# Patient Record
Sex: FEMALE | Race: WHITE | HISPANIC OR LATINO | Employment: FULL TIME | URBAN - METROPOLITAN AREA
[De-identification: names, ages, dates, MRNs, and addresses within clinical notes are randomized per-mention and may not be internally consistent; named-entity substitution may affect disease eponyms.]

---

## 2023-11-20 ENCOUNTER — HOSPITAL ENCOUNTER (EMERGENCY)
Facility: HOSPITAL | Age: 23
Discharge: HOME | End: 2023-11-20
Payer: COMMERCIAL

## 2023-11-20 VITALS
RESPIRATION RATE: 16 BRPM | HEART RATE: 70 BPM | TEMPERATURE: 97.5 F | DIASTOLIC BLOOD PRESSURE: 56 MMHG | WEIGHT: 112 LBS | HEIGHT: 62 IN | OXYGEN SATURATION: 98 % | BODY MASS INDEX: 20.61 KG/M2 | SYSTOLIC BLOOD PRESSURE: 127 MMHG

## 2023-11-20 DIAGNOSIS — N30.91 HEMORRHAGIC CYSTITIS: Primary | ICD-10-CM

## 2023-11-20 DIAGNOSIS — R30.0 DYSURIA: ICD-10-CM

## 2023-11-20 LAB
APPEARANCE UR: ABNORMAL
BACTERIA #/AREA URNS AUTO: ABNORMAL /HPF
BILIRUB UR STRIP.AUTO-MCNC: NEGATIVE MG/DL
COLOR UR: ABNORMAL
GLUCOSE UR STRIP.AUTO-MCNC: NEGATIVE MG/DL
HCG UR QL IA.RAPID: NEGATIVE
KETONES UR STRIP.AUTO-MCNC: NEGATIVE MG/DL
LEUKOCYTE ESTERASE UR QL STRIP.AUTO: NEGATIVE
NITRITE UR QL STRIP.AUTO: POSITIVE
PH UR STRIP.AUTO: 6 [PH]
PROT UR STRIP.AUTO-MCNC: ABNORMAL MG/DL
RBC # UR STRIP.AUTO: ABNORMAL /UL
RBC #/AREA URNS AUTO: >20 /HPF
SP GR UR STRIP.AUTO: 1.02
UROBILINOGEN UR STRIP.AUTO-MCNC: 4 MG/DL
WBC #/AREA URNS AUTO: >50 /HPF
WBC CLUMPS #/AREA URNS AUTO: ABNORMAL /HPF

## 2023-11-20 PROCEDURE — 99285 EMERGENCY DEPT VISIT HI MDM: CPT

## 2023-11-20 PROCEDURE — 81001 URINALYSIS AUTO W/SCOPE: CPT | Performed by: NURSE PRACTITIONER

## 2023-11-20 PROCEDURE — 99283 EMERGENCY DEPT VISIT LOW MDM: CPT | Performed by: PHYSICIAN ASSISTANT

## 2023-11-20 PROCEDURE — 81025 URINE PREGNANCY TEST: CPT | Performed by: NURSE PRACTITIONER

## 2023-11-20 RX ORDER — SULFAMETHOXAZOLE AND TRIMETHOPRIM 800; 160 MG/1; MG/1
1 TABLET ORAL 2 TIMES DAILY
Qty: 10 TABLET | Refills: 0 | Status: SHIPPED | OUTPATIENT
Start: 2023-11-20 | End: 2023-11-25

## 2023-11-20 ASSESSMENT — LIFESTYLE VARIABLES
EVER HAD A DRINK FIRST THING IN THE MORNING TO STEADY YOUR NERVES TO GET RID OF A HANGOVER: NO
HAVE YOU EVER FELT YOU SHOULD CUT DOWN ON YOUR DRINKING: NO
REASON UNABLE TO ASSESS: NO
EVER FELT BAD OR GUILTY ABOUT YOUR DRINKING: NO
HAVE PEOPLE ANNOYED YOU BY CRITICIZING YOUR DRINKING: NO

## 2023-11-20 ASSESSMENT — COLUMBIA-SUICIDE SEVERITY RATING SCALE - C-SSRS
1. IN THE PAST MONTH, HAVE YOU WISHED YOU WERE DEAD OR WISHED YOU COULD GO TO SLEEP AND NOT WAKE UP?: NO
2. HAVE YOU ACTUALLY HAD ANY THOUGHTS OF KILLING YOURSELF?: NO
6. HAVE YOU EVER DONE ANYTHING, STARTED TO DO ANYTHING, OR PREPARED TO DO ANYTHING TO END YOUR LIFE?: NO

## 2023-11-20 ASSESSMENT — PAIN SCALES - GENERAL: PAINLEVEL_OUTOF10: 0 - NO PAIN

## 2023-11-20 ASSESSMENT — PAIN - FUNCTIONAL ASSESSMENT: PAIN_FUNCTIONAL_ASSESSMENT: 0-10

## 2023-11-20 NOTE — ED TRIAGE NOTES
Patient arrives reporting hematuria and dysuria that began last night. She does report she started a new birth control approximately 2 weeks ago. LMP 11/2. Denies any nausea/fevers/chills

## 2023-11-20 NOTE — ED TRIAGE NOTES
This patient was seen and examined in triage    HPI:  Patient is a healthy nontoxic-appearing 23-year-old female with no past medical history, presents to the emergency today for complaint of blood in the urine and burning with urination that began last night.  Patient denies any vaginal discharge or vaginal bleeding.  Patient states last menstrual cycle was 11 6.  Patient denies any injuries trauma or falls, back pain, abdominal pain nausea vomiting, diarrhea or constipation.  Patient denies any fever, shaking, or chills.    Focused PE:  Gen: Well-appearing, not in acute distress  Cardiovascular: Regular rate, normal rhythm, no murmur, no gallop  Respiratory: No adventitious lung sounds auscultated.  Abdomen: No reproducible abdominal tenderness upon palpation,  physical exam may be limited by patient positioning sitting up in a chair  Neuro:  Alert and Oriented, speech clear and coherent    Plan:  Urinalysis and pregnancy test ordered from triage.    For the remainder the patient's work-up and ED course, please see the main ED provider note.  We discussed need for diagnostic testing including lab studies and imaging.  We also discussed that they may be asked to wait in the waiting room while these test are pending.  They understand that if they choose to leave without having the testing completed or resulted that we cannot rule out acute life-threatening illnesses and the risks involved to lead to worsening condition, permanent disability or even death.

## 2023-11-21 NOTE — ED PROVIDER NOTES
HPI   Chief Complaint   Patient presents with    Blood in Urine     Patient arrives reporting hematuria and dysuria that began last night. She does report she started a new birth control approximately 2 weeks ago. LMP 11/2. Denies any nausea/fevers/chills       23-year-old female presents for UTI-like symptoms.  Patient states symptoms for the past few days which include dysuria and bright red blood associated with the tissue paper upon wiping.  Denies fevers.  No reports of lower back discomfort.  She states no intervention prior to arrival                          No data recorded                Patient History   No past medical history on file.  No past surgical history on file.  No family history on file.  Social History     Tobacco Use    Smoking status: Not on file    Smokeless tobacco: Not on file   Substance Use Topics    Alcohol use: Not on file    Drug use: Not on file       Physical Exam   ED Triage Vitals [11/20/23 1834]   Temp Heart Rate Resp BP   36.4 °C (97.5 °F) 70 16 127/56      SpO2 Temp src Heart Rate Source Patient Position   98 % -- -- --      BP Location FiO2 (%)     -- --       Physical Exam  Vitals and nursing note reviewed.   Constitutional:       General: She is not in acute distress.     Appearance: Normal appearance. She is normal weight. She is not ill-appearing, toxic-appearing or diaphoretic.   HENT:      Head: Normocephalic.      Nose: Nose normal.      Mouth/Throat:      Mouth: Mucous membranes are moist.   Eyes:      Extraocular Movements: Extraocular movements intact.      Conjunctiva/sclera: Conjunctivae normal.   Cardiovascular:      Rate and Rhythm: Normal rate and regular rhythm.      Pulses: Normal pulses.   Pulmonary:      Effort: Pulmonary effort is normal. No respiratory distress.      Breath sounds: Normal breath sounds.   Abdominal:      General: Abdomen is flat. Bowel sounds are normal. There is no distension.      Palpations: Abdomen is soft.      Tenderness: There is  no abdominal tenderness. There is no guarding or rebound.   Musculoskeletal:         General: Normal range of motion.      Cervical back: Normal range of motion and neck supple.   Skin:     General: Skin is warm and dry.      Capillary Refill: Capillary refill takes less than 2 seconds.   Neurological:      General: No focal deficit present.      Mental Status: She is alert and oriented to person, place, and time.   Psychiatric:         Mood and Affect: Mood normal.         Behavior: Behavior normal.         Thought Content: Thought content normal.         Judgment: Judgment normal.         ED Course & MDM   ED Course as of 11/20/23 1904   Mon Nov 20, 2023 1900 Nitrite, Urine(!): POSITIVE [DS]   1900 HCG, Urine: NEGATIVE [DS]      ED Course User Index  [DS] Joe Tello PA-C         Diagnoses as of 11/20/23 1904   Hemorrhagic cystitis   Dysuria       Medical Decision Making  Patient found to be well-appearing on exam.  There is no CVA tenderness suggesting a pyelonephritis.  The patient does not appear to be overtly ill.  She is afebrile.  Urinalysis was remarkable for nitrite positive UTI.  Pregnancy test negative.  Patient be treated with Bactrim.  Recommended close follow-up with her primary care physician for further evaluation        Procedure  Procedures     Jeo Tello PA-C  11/20/23 1905